# Patient Record
(demographics unavailable — no encounter records)

---

## 2017-03-28 NOTE — PHYS DOC
Past Medical History


Past Medical History:  Hypertension


Past Surgical History:  No Surgical History


Alcohol Use:  Rarely


Drug Use:  None





Adult General


Chief Complaint


Chief Complaint:  ABDOMINAL PAIN





HPI


HPI


Patient is a 38  year old female who presents with 3 weeks of right lower 

abdominal and back pain that is constant, colicky, stabbing, spasm-like, and 

achy.  Has intermittent fluctuations of intensity that causes nausea/vomiting.  

Does sometimes radiate to groin/vagina.  Has some chills.  Denies cough, 

diarrhea, constipation, vaginal bleeding or discharge, dysuria, hematuria, 

measured fever, chest pain, dyspnea, rash, sick contacts, trauma.





Review of Systems


Review of Systems





Constitutional: Denies measured fever []


Eyes: Denies change in visual acuity, redness, or eye pain []


HENT: Denies nasal congestion or sore throat []


Respiratory: Denies cough or shortness of breath []


Cardiovascular: No additional information not addressed in HPI []


GI: Denies bloody stools or diarrhea []


: Denies dysuria or hematuria []


Musculoskeletal: Denies joint pain []


Integument: Denies rash or skin lesions []


Neurologic: Denies headache, focal weakness or sensory changes []


Endocrine: Denies polyuria or polydipsia []





Current Medications


Current Medications





 Current Medications








 Medications


  (Trade)  Dose


 Ordered  Sig/Momo  Start Time


 Stop Time Status Last Admin


Dose Admin


 


 Ondansetron HCl


  (Zofran)  4 mg  1X  ONCE  3/28/17 19:15


 3/28/17 19:16 DC 3/28/17 19:00


4 MG


 


 Sodium Chloride


  (Iv Sodium


 Chloride 0.9%


 1000ml Bag)  1,000 ml @ 


 1,000 mls/hr  Q1H  3/28/17 19:15


 3/28/17 20:14 DC 3/28/17 19:00


1,000 MLS/HR











Allergies


Allergies





 Allergies








Coded Allergies Type Severity Reaction Last Updated Verified


 


  Penicillins Allergy Intermediate hives 8/7/16 Yes


 


  morphine Allergy Unknown  12/16/16 Yes











Physical Exam


Physical Exam





Constitutional: Well developed, well nourished, no acute distress, non-toxic 

appearance. []


HENT: Normocephalic, atraumatic, bilateral external ears normal, oropharynx 

moist, nose normal. []


Eyes: PERRLA, EOMI. [] 


Neck: Normal range of motion, supple. [] 


Cardiovascular:Heart rate regular rhythm []


Lungs & Thorax:  Bilateral breath sounds clear to auscultation []


Abdomen: Bowel sounds normal, soft, no tenderness. [] 


Skin: Warm, dry, no erythema, no rash. [] 


Back: No midline spinal tenderness, no CVA tenderness.  Has some right lumbar 

paraspinal tenderness [] 


Extremities: ROM intact, no edema. [] 


Neurologic: Alert and oriented X 3, normal motor function, normal sensory 

function, no focal deficits noted. []


Psychologic: Affect normal, judgement normal, mood normal. []





Current Patient Data


Vital Signs





 Vital Signs








  Date Time  Temp Pulse Resp B/P Pulse Ox O2 Delivery O2 Flow Rate FiO2


 


3/28/17 18:02 98.1 82 16 160/95 99 Room Air  





 98.1       








Lab Values





 Laboratory Tests








Test


  3/28/17


18:14 3/28/17


18:24


 


POC Urine HCG, Qualitative


  Hcg negative


(Negative) 


 


 


White Blood Count


  


  10.0x10^3/uL


(4.0-11.0)


 


Red Blood Count


  


  4.41x10^6/uL


(3.50-5.40)


 


Hemoglobin


  


  12.2g/dL


(12.0-15.5)


 


Hematocrit


  


  38.9%


(36.0-47.0)


 


Mean Corpuscular Volume  88fL ()  


 


Mean Corpuscular Hemoglobin  28pg (25-35)  


 


Mean Corpuscular Hemoglobin


Concent 


  31g/dL (31-37)


 


 


Red Cell Distribution Width


  


  16.5%


(11.5-14.5)  H


 


Platelet Count


  


  230x10^3/uL


(140-400)


 


Neutrophils (%) (Auto)  63% (31-73)  


 


Lymphocytes (%) (Auto)  31% (24-48)  


 


Monocytes (%) (Auto)  5% (0-9)  


 


Eosinophils (%) (Auto)  1% (0-3)  


 


Basophils (%) (Auto)  0% (0-3)  


 


Neutrophils # (Auto)


  


  6.3x10^3uL


(1.8-7.7)


 


Lymphocytes # (Auto)


  


  3.1x10^3/uL


(1.0-4.8)


 


Monocytes # (Auto)


  


  0.5x10^3/uL


(0.0-1.1)


 


Eosinophils # (Auto)


  


  0.1x10^3/uL


(0.0-0.7)


 


Basophils # (Auto)


  


  0.0x10^3/uL


(0.0-0.2)


 


Urine Collection Type  Unknown  


 


Urine Color  Yellow  


 


Urine Clarity  Clear  


 


Urine pH  5.5  


 


Urine Specific Gravity  >=1.030  


 


Urine Protein


  


  Negativemg/dL


(NEG-TRACE)


 


Urine Glucose (UA)


  


  Negativemg/dL


(NEG)


 


Urine Ketones (Stick)


  


  Negativemg/dL


(NEG)


 


Urine Blood


  


  Negative (NEG)


 


 


Urine Nitrite


  


  Negative (NEG)


 


 


Urine Bilirubin


  


  Negative (NEG)


 


 


Urine Urobilinogen Dipstick


  


  0.2mg/dL (0.2


mg/dL)


 


Urine Leukocyte Esterase


  


  Negative (NEG)


 


 


Urine RBC  0/HPF (0-2)  


 


Urine WBC  0/HPF (0-4)  


 


Urine Squamous Epithelial


Cells 


  Mod/LPF  


 


 


Urine Bacteria


  


  Few/HPF


(0-FEW)


 


Urine Mucus  Mod/LPF  


 


Sodium Level


  


  139mmol/L


(136-145)


 


Potassium Level


  


  4.2mmol/L


(3.5-5.1)


 


Chloride Level


  


  103mmol/L


()


 


Carbon Dioxide Level


  


  27mmol/L


(21-32)


 


Anion Gap  9 (6-14)  


 


Blood Urea Nitrogen


  


  11mg/dL (7-20)


 


 


Creatinine


  


  0.7mg/dL


(0.6-1.0)


 


Estimated GFR


(Cockcroft-Gault) 


  113.3  


 


 


Glucose Level


  


  120mg/dL


(70-99)  H


 


Calcium Level


  


  9.4mg/dL


(8.5-10.1)


 


Total Bilirubin


  


  0.1mg/dL


(0.2-1.0)  L


 


Direct Bilirubin


  


  < 0.1mg/dL


(0.0-0.2)


 


Aspartate Amino Transferase


(AST) 


  14U/L (15-37)


L


 


Alanine Aminotransferase (ALT)  25U/L (14-59)  


 


Alkaline Phosphatase


  


  91U/L ()


 


 


Total Protein


  


  7.7g/dL


(6.4-8.2)


 


Albumin


  


  3.3g/dL


(3.4-5.0)  L


 


Lipase


  


  87U/L ()


 





 Laboratory Tests


3/28/17 18:24








 Laboratory Tests


3/28/17 18:24














Course & Med Decision Making


Course & Med Decision Making


Pertinent Labs and Imaging studies reviewed. (See chart for details)





Laboratory evaluation is unremarkable.  Discussed  symptomatic care.  Return 

precautions given.  She understands and agrees with plan.





Dragon Disclaimer


Dragon Disclaimer


This electronic medical record was generated, in whole or in part, using a 

voice recognition dictation system.





Departure


Departure


Impression:  


 Primary Impression:  


 Abdominal pain


 Additional Impression:  


 Low back pain


Disposition:  01 HOME, SELF-CARE


Condition:  STABLE


Referrals:  


NO PCP (PCP)


Patient Instructions:  Abdominal Pain, Easy-to-Read





Additional Instructions:


Take naproxen as needed for pain.  Take cyclobenzaprine as needed for spasms.  

Take promethazine as needed for nausea.  Follow up with your primary care 

doctor within 1 week.  Return for any concerns.


Scripts


Promethazine Hcl 12.5 Mg Tablet1 Tab PO Q6HRS PRN NAUSEA #10 TAB


   Prov:GIBSON MALONEY MD         3/28/17


Cyclobenzaprine Hcl 5 Mg Tablet1 Tab PO TID PRN MUSCLE SPASMS #10 TAB


   Prov:GIBSON MALONEY MD         3/28/17





Problem Qualifiers








 Primary Impression:  


 Abdominal pain


 Abdominal location:  right lower quadrant  Qualified Code:  R10.31 - Right 

lower quadrant pain


 Additional Impression:  


 Low back pain


 Chronicity:  acute  Back pain laterality:  right  Sciatica presence:  without 

sciatica  Qualified Code:  M54.5 - Low back pain





GIBSON MALONEY MD Mar 28, 2017 19:55